# Patient Record
Sex: FEMALE | URBAN - METROPOLITAN AREA
[De-identification: names, ages, dates, MRNs, and addresses within clinical notes are randomized per-mention and may not be internally consistent; named-entity substitution may affect disease eponyms.]

---

## 2020-06-19 ENCOUNTER — OFFICE VISIT (OUTPATIENT)
Dept: URBAN - METROPOLITAN AREA CLINIC 70 | Facility: CLINIC | Age: 45
End: 2020-06-19

## 2020-06-29 ENCOUNTER — OFFICE VISIT (OUTPATIENT)
Dept: URBAN - METROPOLITAN AREA CLINIC 118 | Facility: CLINIC | Age: 45
End: 2020-06-29

## 2020-07-16 ENCOUNTER — OFFICE VISIT (OUTPATIENT)
Dept: URBAN - METROPOLITAN AREA CLINIC 70 | Facility: CLINIC | Age: 45
End: 2020-07-16

## 2020-07-16 ENCOUNTER — WEB ENCOUNTER (OUTPATIENT)
Dept: URBAN - METROPOLITAN AREA CLINIC 70 | Facility: CLINIC | Age: 45
End: 2020-07-16

## 2020-07-16 ENCOUNTER — LAB OUTSIDE AN ENCOUNTER (OUTPATIENT)
Dept: URBAN - METROPOLITAN AREA CLINIC 70 | Facility: CLINIC | Age: 45
End: 2020-07-16

## 2020-07-16 ENCOUNTER — OFFICE VISIT (OUTPATIENT)
Dept: URBAN - METROPOLITAN AREA CLINIC 70 | Facility: CLINIC | Age: 45
End: 2020-07-16
Payer: MEDICAID

## 2020-07-16 DIAGNOSIS — K25.9 GASTRIC ULCER DUE TO HELICOBACTER PYLORI: ICD-10-CM

## 2020-07-16 DIAGNOSIS — R10.13 EPIGASTRIC PAIN: ICD-10-CM

## 2020-07-16 PROCEDURE — G8427 DOCREV CUR MEDS BY ELIG CLIN: HCPCS | Performed by: REGISTERED NURSE

## 2020-07-16 PROCEDURE — 99214 OFFICE O/P EST MOD 30 MIN: CPT | Performed by: REGISTERED NURSE

## 2020-07-16 PROCEDURE — G8419 CALC BMI OUT NRM PARAM NOF/U: HCPCS | Performed by: REGISTERED NURSE

## 2020-07-16 PROCEDURE — G9903 PT SCRN TBCO ID AS NON USER: HCPCS | Performed by: REGISTERED NURSE

## 2020-07-16 RX ORDER — PANTOPRAZOLE SODIUM 40 MG/1
1 TABLET TABLET, DELAYED RELEASE ORAL TWICE A DAY
Qty: 60 TABLET | Refills: 3 | OUTPATIENT
Start: 2020-07-16

## 2020-07-16 RX ORDER — HUMAN INSULIN 100 [IU]/ML
AS DIRECTED INJECTION, SUSPENSION SUBCUTANEOUS
Status: ACTIVE | COMMUNITY
Start: 2020-07-16

## 2020-07-16 RX ORDER — TRAMADOL HYDROCHLORIDE 50 MG/1
1 TABLET AS NEEDED TABLET, FILM COATED ORAL
Qty: 30 | Refills: 0 | OUTPATIENT
Start: 2020-07-16

## 2020-07-16 RX ORDER — PANTOPRAZOLE SODIUM 40 MG/1
1 TABLET TABLET, DELAYED RELEASE ORAL ONCE A DAY
Qty: 30 | Status: ACTIVE | COMMUNITY
Start: 2020-07-16

## 2020-07-16 RX ORDER — LOSARTAN POTASSIUM 100 MG/1
1 TABLET TABLET ORAL ONCE A DAY
Qty: 30 | Status: ACTIVE | COMMUNITY
Start: 2020-07-16

## 2020-07-16 RX ORDER — AMOXICILLIN 500 MG/1
2 CAPSULES CAPSULE ORAL
Qty: 40 CAPSULE | Refills: 0 | OUTPATIENT
Start: 2020-07-16 | End: 2020-07-26

## 2020-07-16 RX ORDER — CLARITHROMYCIN 500 MG/1
1 TABLET TABLET, FILM COATED ORAL
Qty: 20 TABLET | Refills: 0 | OUTPATIENT
Start: 2020-07-16 | End: 2020-07-26

## 2020-07-16 NOTE — HPI-TODAY'S VISIT:
Pt was hospitalized at [facility] in April of this year. On 4/28/20, she had an EGD done by Dr Stokes that showed an actively bleeding 1.2cm ulcer in the distal gastric body. Attempt to place a clip was unsuccessful. The area was treated with epi. A repeat procedure was done the following day due to rebleeding. There was fresh blood and a clot at the ulcer site but no active bleeding at the time of the procedure. Biopsies were positive for hpylori. She continued to have bleeding had had thrombotic embolization performed during same admission.  She reports persistent epigastric pain that is severe at times. Pain interferes with sleep. She is taking pantoprazole bid and carafate tid.

## 2020-08-03 ENCOUNTER — OFFICE VISIT (OUTPATIENT)
Dept: URBAN - METROPOLITAN AREA SURGERY CENTER 24 | Facility: SURGERY CENTER | Age: 45
End: 2020-08-03

## 2020-08-04 ENCOUNTER — OFFICE VISIT (OUTPATIENT)
Dept: URBAN - METROPOLITAN AREA SURGERY CENTER 24 | Facility: SURGERY CENTER | Age: 45
End: 2020-08-04

## 2020-08-04 RX ORDER — PANTOPRAZOLE SODIUM 40 MG/1
1 TABLET TABLET, DELAYED RELEASE ORAL TWICE A DAY
Qty: 60 TABLET | Refills: 3 | Status: ACTIVE | COMMUNITY
Start: 2020-07-16

## 2020-08-04 RX ORDER — TRAMADOL HYDROCHLORIDE 50 MG/1
1 TABLET AS NEEDED TABLET, FILM COATED ORAL
Qty: 30 | Refills: 0 | Status: ACTIVE | COMMUNITY
Start: 2020-07-16

## 2020-08-04 RX ORDER — LOSARTAN POTASSIUM 100 MG/1
1 TABLET TABLET ORAL ONCE A DAY
Qty: 30 | Status: ACTIVE | COMMUNITY
Start: 2020-07-16

## 2020-08-04 RX ORDER — HUMAN INSULIN 100 [IU]/ML
AS DIRECTED INJECTION, SUSPENSION SUBCUTANEOUS
Status: ACTIVE | COMMUNITY
Start: 2020-07-16

## 2020-08-25 ENCOUNTER — OFFICE VISIT (OUTPATIENT)
Dept: URBAN - METROPOLITAN AREA SURGERY CENTER 24 | Facility: SURGERY CENTER | Age: 45
End: 2020-08-25

## 2020-12-23 ENCOUNTER — OFFICE VISIT (OUTPATIENT)
Dept: URBAN - METROPOLITAN AREA CLINIC 70 | Facility: CLINIC | Age: 45
End: 2020-12-23

## 2022-04-28 ENCOUNTER — LAB OUTSIDE AN ENCOUNTER (OUTPATIENT)
Dept: URBAN - METROPOLITAN AREA CLINIC 70 | Facility: CLINIC | Age: 47
End: 2022-04-28

## 2022-04-28 ENCOUNTER — OFFICE VISIT (OUTPATIENT)
Dept: URBAN - METROPOLITAN AREA CLINIC 70 | Facility: CLINIC | Age: 47
End: 2022-04-28
Payer: MEDICAID

## 2022-04-28 ENCOUNTER — WEB ENCOUNTER (OUTPATIENT)
Dept: URBAN - METROPOLITAN AREA CLINIC 70 | Facility: CLINIC | Age: 47
End: 2022-04-28

## 2022-04-28 VITALS
TEMPERATURE: 97.5 F | DIASTOLIC BLOOD PRESSURE: 109 MMHG | HEIGHT: 60 IN | BODY MASS INDEX: 26.9 KG/M2 | HEART RATE: 118 BPM | SYSTOLIC BLOOD PRESSURE: 146 MMHG | WEIGHT: 137 LBS

## 2022-04-28 DIAGNOSIS — Z12.11 COLON CANCER SCREENING: ICD-10-CM

## 2022-04-28 DIAGNOSIS — K27.4 PEPTIC ULCER DISEASE WITH HEMORRHAGE: ICD-10-CM

## 2022-04-28 PROCEDURE — 99214 OFFICE O/P EST MOD 30 MIN: CPT | Performed by: REGISTERED NURSE

## 2022-04-28 RX ORDER — HYDROCODONE BITARTRATE AND ACETAMINOPHEN 7.5; 325 MG/1; MG/1
1 TABLET AS NEEDED TABLET ORAL
Status: ACTIVE | COMMUNITY

## 2022-04-28 RX ORDER — PANTOPRAZOLE SODIUM 40 MG/1
1 TABLET TABLET, DELAYED RELEASE ORAL TWICE A DAY
Qty: 60 TABLET | Refills: 3 | Status: ACTIVE | COMMUNITY
Start: 2020-07-16

## 2022-04-28 RX ORDER — LOSARTAN POTASSIUM 100 MG/1
1 TABLET TABLET ORAL ONCE A DAY
Qty: 30 | Status: ACTIVE | COMMUNITY
Start: 2020-07-16

## 2022-04-28 RX ORDER — ONDANSETRON 4 MG/1
1 TABLET ON THE TONGUE AND ALLOW TO DISSOLVE TABLET, ORALLY DISINTEGRATING ORAL ONCE A DAY
Status: ACTIVE | COMMUNITY

## 2022-04-28 RX ORDER — ROSUVASTATIN CALCIUM 5 MG/1
1 TABLET TABLET, FILM COATED ORAL ONCE A DAY
Status: ACTIVE | COMMUNITY

## 2022-04-28 RX ORDER — TIZANIDINE HYDROCHLORIDE 4 MG/1
1 CAPSULE AS NEEDED CAPSULE, GELATIN COATED ORAL THREE TIMES A DAY
Status: ACTIVE | COMMUNITY

## 2022-04-28 RX ORDER — AMLODIPINE BESYLATE 5 MG/1
1 TABLET TABLET ORAL ONCE A DAY
Status: ACTIVE | COMMUNITY

## 2022-04-28 RX ORDER — GABAPENTIN 600 MG/1
1 TABLET TABLET, FILM COATED ORAL ONCE A DAY
Status: ACTIVE | COMMUNITY

## 2022-04-28 RX ORDER — FAMOTIDINE 20 MG/1
1 TABLET AT BEDTIME AS NEEDED TABLET, FILM COATED ORAL ONCE A DAY
Status: ACTIVE | COMMUNITY

## 2022-04-28 RX ORDER — SUCRALFATE 1 G/1
1 TABLET ON AN EMPTY STOMACH TABLET ORAL
Qty: 90 | Refills: 2

## 2022-04-28 RX ORDER — HUMAN INSULIN 100 [IU]/ML
AS DIRECTED INJECTION, SUSPENSION SUBCUTANEOUS
Status: ACTIVE | COMMUNITY
Start: 2020-07-16

## 2022-04-28 RX ORDER — ONDANSETRON 4 MG/1
1 TABLET ON THE TONGUE AND ALLOW TO DISSOLVE TABLET, ORALLY DISINTEGRATING ORAL EVERY 8 HOURS PRN
Qty: 30 | Refills: 1

## 2022-04-28 RX ORDER — TRAMADOL HYDROCHLORIDE 50 MG/1
1 TABLET AS NEEDED TABLET, FILM COATED ORAL
Qty: 30 | Refills: 0 | Status: DISCONTINUED | COMMUNITY
Start: 2020-07-16

## 2022-04-28 RX ORDER — PANTOPRAZOLE SODIUM 40 MG/1
1 TABLET TABLET, DELAYED RELEASE ORAL TWICE A DAY
Qty: 180 TABLET | Refills: 3
Start: 2020-07-16

## 2022-04-28 RX ORDER — SUCRALFATE 1 G/1
1 TABLET ON AN EMPTY STOMACH TABLET ORAL TWICE A DAY
Status: ACTIVE | COMMUNITY

## 2022-04-28 RX ORDER — METOPROLOL TARTRATE 25 MG/1
1 TABLET WITH FOOD TABLET, FILM COATED ORAL TWICE A DAY
Status: ACTIVE | COMMUNITY

## 2022-04-28 NOTE — HPI-TODAY'S VISIT:
Pt was recently hospitalized with DKA. Reports having severe N/V during that time. Blood sugars are staying below 200 now. She still has some occasional nausea but no vomiting. She has a hx of PUD in 2020. She was hospitalized at [facility] with UGI bleed. EGD was done on 4/27/20 and again on 4/29/20 with finding of multiple bleeding gastric ulcers. Bleeding could not be controlled endoscopically so IR embolization was done. Pathology at that time was positive for hpylori for which she was treated. Repeat EGD was recommended but she was lost to f/u.  Pt due for colon cancer screening. No prior colonoscopy. No family history of colon cancer. Pt denies any abdominal pain, diarrhea, constipation, rectal bleeding or weight loss.

## 2022-06-03 ENCOUNTER — OFFICE VISIT (OUTPATIENT)
Dept: URBAN - METROPOLITAN AREA SURGERY CENTER 24 | Facility: SURGERY CENTER | Age: 47
End: 2022-06-03

## 2022-06-21 PROBLEM — 64121000: Status: ACTIVE | Noted: 2022-04-28

## 2022-07-19 ENCOUNTER — OFFICE VISIT (OUTPATIENT)
Dept: URBAN - METROPOLITAN AREA SURGERY CENTER 24 | Facility: SURGERY CENTER | Age: 47
End: 2022-07-19

## 2022-10-18 ENCOUNTER — OFFICE VISIT (OUTPATIENT)
Dept: URBAN - METROPOLITAN AREA CLINIC 70 | Facility: CLINIC | Age: 47
End: 2022-10-18

## 2022-10-21 ENCOUNTER — LAB OUTSIDE AN ENCOUNTER (OUTPATIENT)
Dept: URBAN - METROPOLITAN AREA CLINIC 70 | Facility: CLINIC | Age: 47
End: 2022-10-21

## 2022-10-21 ENCOUNTER — DASHBOARD ENCOUNTERS (OUTPATIENT)
Age: 47
End: 2022-10-21

## 2022-10-21 ENCOUNTER — OFFICE VISIT (OUTPATIENT)
Dept: URBAN - METROPOLITAN AREA CLINIC 70 | Facility: CLINIC | Age: 47
End: 2022-10-21
Payer: MEDICAID

## 2022-10-21 VITALS
SYSTOLIC BLOOD PRESSURE: 118 MMHG | DIASTOLIC BLOOD PRESSURE: 77 MMHG | WEIGHT: 141.3 LBS | TEMPERATURE: 97.2 F | BODY MASS INDEX: 27.74 KG/M2 | HEIGHT: 60 IN | HEART RATE: 57 BPM

## 2022-10-21 DIAGNOSIS — Z12.11 COLON CANCER SCREENING: ICD-10-CM

## 2022-10-21 DIAGNOSIS — K25.9 GASTRIC ULCER DUE TO HELICOBACTER PYLORI: ICD-10-CM

## 2022-10-21 PROCEDURE — 99214 OFFICE O/P EST MOD 30 MIN: CPT | Performed by: REGISTERED NURSE

## 2022-10-21 RX ORDER — AMLODIPINE BESYLATE 5 MG/1
1 TABLET TABLET ORAL ONCE A DAY
Status: ACTIVE | COMMUNITY

## 2022-10-21 RX ORDER — ONDANSETRON 4 MG/1
1 TABLET ON THE TONGUE AND ALLOW TO DISSOLVE TABLET, ORALLY DISINTEGRATING ORAL EVERY 8 HOURS PRN
Qty: 30 | Refills: 1

## 2022-10-21 RX ORDER — FAMOTIDINE 20 MG/1
1 TABLET AT BEDTIME AS NEEDED TABLET, FILM COATED ORAL ONCE A DAY
Status: ACTIVE | COMMUNITY

## 2022-10-21 RX ORDER — GABAPENTIN 600 MG/1
1 TABLET TABLET, FILM COATED ORAL ONCE A DAY
Status: DISCONTINUED | COMMUNITY

## 2022-10-21 RX ORDER — HYDROCODONE BITARTRATE AND ACETAMINOPHEN 10; 325 MG/1; MG/1
1 TABLET AS NEEDED TABLET ORAL
Status: ACTIVE | COMMUNITY

## 2022-10-21 RX ORDER — METOPROLOL TARTRATE 25 MG/1
1 TABLET WITH FOOD TABLET, FILM COATED ORAL TWICE A DAY
Status: ACTIVE | COMMUNITY

## 2022-10-21 RX ORDER — PANTOPRAZOLE SODIUM 40 MG/1
1 TABLET TABLET, DELAYED RELEASE ORAL TWICE A DAY
Qty: 180 TABLET | Refills: 3
Start: 2020-07-16

## 2022-10-21 RX ORDER — TIZANIDINE HYDROCHLORIDE 4 MG/1
1 CAPSULE AS NEEDED CAPSULE, GELATIN COATED ORAL THREE TIMES A DAY
Status: ACTIVE | COMMUNITY

## 2022-10-21 RX ORDER — PANTOPRAZOLE SODIUM 40 MG/1
1 TABLET TABLET, DELAYED RELEASE ORAL TWICE A DAY
Qty: 180 TABLET | Refills: 3 | Status: ACTIVE | COMMUNITY
Start: 2020-07-16

## 2022-10-21 RX ORDER — SUCRALFATE 1 G/1
1 TABLET ON AN EMPTY STOMACH TABLET ORAL
Qty: 90 | Refills: 2
End: 2023-01-19

## 2022-10-21 RX ORDER — SUCRALFATE 1 G/1
1 TABLET ON AN EMPTY STOMACH TABLET ORAL
Qty: 90 | Refills: 2 | Status: ACTIVE | COMMUNITY

## 2022-10-21 RX ORDER — ONDANSETRON 4 MG/1
1 TABLET ON THE TONGUE AND ALLOW TO DISSOLVE TABLET, ORALLY DISINTEGRATING ORAL EVERY 8 HOURS PRN
Qty: 30 | Refills: 1 | Status: ACTIVE | COMMUNITY

## 2022-10-21 RX ORDER — LOSARTAN POTASSIUM 100 MG/1
1 TABLET TABLET ORAL ONCE A DAY
Qty: 30 | Status: ACTIVE | COMMUNITY
Start: 2020-07-16

## 2022-10-21 RX ORDER — ROSUVASTATIN CALCIUM 5 MG/1
1 TABLET TABLET, FILM COATED ORAL ONCE A DAY
Status: ACTIVE | COMMUNITY

## 2022-10-21 RX ORDER — HUMAN INSULIN 100 [IU]/ML
AS DIRECTED INJECTION, SUSPENSION SUBCUTANEOUS
Status: ACTIVE | COMMUNITY
Start: 2020-07-16

## 2022-10-21 NOTE — HPI-OTHER HISTORIES
Note from OV 4/28/22: Pt was recently hospitalized with DKA. Reports having severe N/V during that time. Blood sugars are staying below 200 now. She still has some occasional nausea but no vomiting. She has a hx of PUD in 2020. She was hospitalized at [facility] with UGI bleed. EGD was done on 4/27/20 and again on 4/29/20 with finding of multiple bleeding gastric ulcers. Bleeding could not be controlled endoscopically so IR embolization was done. Pathology at that time was positive for hpylori for which she was treated. Repeat EGD was recommended but she was lost to f/u.  Pt due for colon cancer screening. No prior colonoscopy. No family history of colon cancer. Pt denies any abdominal pain, diarrhea, constipation, rectal bleeding or weight loss. ---------------------------------------

## 2022-10-21 NOTE — HPI-TODAY'S VISIT:
Pt did not get EGD/colonoscopy done due to transportation issues. She would like to reschedule. No new complaints.

## 2022-10-22 PROBLEM — 103691000119106 GASTRIC ULCER DUE TO HELICOBACTER PYLORI: Status: ACTIVE | Noted: 2022-10-22

## 2022-12-08 ENCOUNTER — OFFICE VISIT (OUTPATIENT)
Dept: URBAN - METROPOLITAN AREA SURGERY CENTER 24 | Facility: SURGERY CENTER | Age: 47
End: 2022-12-08